# Patient Record
Sex: FEMALE | Race: BLACK OR AFRICAN AMERICAN | NOT HISPANIC OR LATINO | Employment: UNEMPLOYED | URBAN - METROPOLITAN AREA
[De-identification: names, ages, dates, MRNs, and addresses within clinical notes are randomized per-mention and may not be internally consistent; named-entity substitution may affect disease eponyms.]

---

## 2023-11-05 ENCOUNTER — HOSPITAL ENCOUNTER (EMERGENCY)
Facility: HOSPITAL | Age: 58
Discharge: HOME | End: 2023-11-05
Payer: COMMERCIAL

## 2023-11-05 ENCOUNTER — APPOINTMENT (OUTPATIENT)
Dept: RADIOLOGY | Facility: HOSPITAL | Age: 58
End: 2023-11-05
Payer: COMMERCIAL

## 2023-11-05 VITALS
RESPIRATION RATE: 18 BRPM | BODY MASS INDEX: 30.21 KG/M2 | OXYGEN SATURATION: 96 % | WEIGHT: 160 LBS | HEART RATE: 68 BPM | DIASTOLIC BLOOD PRESSURE: 85 MMHG | HEIGHT: 61 IN | TEMPERATURE: 97.2 F | SYSTOLIC BLOOD PRESSURE: 136 MMHG

## 2023-11-05 DIAGNOSIS — G62.9 NEUROPATHY: Primary | ICD-10-CM

## 2023-11-05 PROCEDURE — 99284 EMERGENCY DEPT VISIT MOD MDM: CPT | Performed by: PHYSICIAN ASSISTANT

## 2023-11-05 PROCEDURE — 99283 EMERGENCY DEPT VISIT LOW MDM: CPT | Mod: 25

## 2023-11-05 PROCEDURE — 73110 X-RAY EXAM OF WRIST: CPT | Mod: LT,FR

## 2023-11-05 PROCEDURE — 73110 X-RAY EXAM OF WRIST: CPT | Mod: LEFT SIDE | Performed by: RADIOLOGY

## 2023-11-05 PROCEDURE — 2500000001 HC RX 250 WO HCPCS SELF ADMINISTERED DRUGS (ALT 637 FOR MEDICARE OP): Performed by: PHYSICIAN ASSISTANT

## 2023-11-05 RX ORDER — NAPROXEN 500 MG/1
500 TABLET ORAL ONCE
Status: COMPLETED | OUTPATIENT
Start: 2023-11-05 | End: 2023-11-05

## 2023-11-05 RX ORDER — NAPROXEN 500 MG/1
500 TABLET ORAL 2 TIMES DAILY
Qty: 20 TABLET | Refills: 0 | Status: SHIPPED | OUTPATIENT
Start: 2023-11-05

## 2023-11-05 RX ADMIN — NAPROXEN 500 MG: 500 TABLET ORAL at 21:04

## 2023-11-05 ASSESSMENT — PAIN - FUNCTIONAL ASSESSMENT: PAIN_FUNCTIONAL_ASSESSMENT: 0-10

## 2023-11-05 ASSESSMENT — LIFESTYLE VARIABLES
HAVE PEOPLE ANNOYED YOU BY CRITICIZING YOUR DRINKING: NO
EVER FELT BAD OR GUILTY ABOUT YOUR DRINKING: NO
REASON UNABLE TO ASSESS: NO
HAVE YOU EVER FELT YOU SHOULD CUT DOWN ON YOUR DRINKING: NO
EVER HAD A DRINK FIRST THING IN THE MORNING TO STEADY YOUR NERVES TO GET RID OF A HANGOVER: NO

## 2023-11-05 ASSESSMENT — COLUMBIA-SUICIDE SEVERITY RATING SCALE - C-SSRS
1. IN THE PAST MONTH, HAVE YOU WISHED YOU WERE DEAD OR WISHED YOU COULD GO TO SLEEP AND NOT WAKE UP?: NO
2. HAVE YOU ACTUALLY HAD ANY THOUGHTS OF KILLING YOURSELF?: NO
6. HAVE YOU EVER DONE ANYTHING, STARTED TO DO ANYTHING, OR PREPARED TO DO ANYTHING TO END YOUR LIFE?: NO

## 2023-11-05 ASSESSMENT — PAIN SCALES - GENERAL: PAINLEVEL_OUTOF10: 9

## 2023-11-06 NOTE — ED PROVIDER NOTES
"HPI:  58-year-old female presenting for left wrist pain.  States it began hurting her a few hours ago without any known inciting incident including fall or any other injury.  Was seen at [facility] on 10/21 for right wrist pains and at that time was given gabapentin and oxycodone.  States this feels very similar to that type of pain.  At that appointment she was scheduled for an outpatient neurology appointment to have an EMG performed which is scheduled for next month.  She denies any fall or any other injury for her left hand here.  She denies any weakness or paresthesias.  She is left-hand dominant.      Physical Exam:   GEN: Vitals noted. NAD  EYES:  EOMs grossly intact, anicteric sclera  FLORENCIO: Mucosa moist.  NECK: Supple.  CARD: RRR  PULMONARY: Moving air well. Clear all lung fields.  ABDOMEN: Soft, no guarding, no rigidity. Nontender. NABS  EXTREMITIES: Full ROM, no pitting edema, poorly reproduced diffuse pain poorly localized to the left wrist including with light palpation, positive Phalen's test, decreased strength secondary to pain, brisk capillary refill with 2+ equal pulses bilaterally.  No anatomical snuffbox tenderness  SKIN: Intact, warm and dry  NEURO: Alert and oriented x 3, speech is clear, no obvious deficits noted.       ----------------------------------------------------------------------------------------------------------------------------    MDM:  58-year-old female presenting for left wrist pain.  On exam she is well-appearing able to comfortably.  Vital signs stable.  She has inconsistent exam and is tender to even light palpation, neurovascular intact.  She is given naproxen for pain and will perform plain film x-rays.  She had no anatomical snuffbox tenderness to be concerned for occult scaphoid fracture.  X-ray shows a chronic subluxation of the distal ulna, further attention was given to this area and she has no tenderness at this point she states that it has been like this for \"a " "while\"-seemingly years.  She does have scheduled follow-up with neurology and is encouraged to keep that appointment.  She was reevaluated and was well-appearing resting comfortably however did ask for more oxycodone that she received at [facility] a few weeks ago and was not happy that she would not be receiving more narcotics at this time.  She is encouraged to continue her scheduled follow-up and follow-up with PCP as needed.  Prescription for naproxen provided.  Return precautions reviewed.    XR wrist left 3+ views   Final Result   Relative dorsal subluxation of the distal ulna.   Signed by Jose Angel Raymond II, MD        Labs Reviewed - No data to display  Diagnoses as of 11/05/23 2253   Neuropathy     ----------------------------------------------------------------------------------------------------------------------------    This note was dictated using a speech recognition program.  While an attempt was made at proof reading to minimize errors, minor errors in transcription may be present call for questions.     Otf Fernando PA-C  11/05/23 2254    "

## 2023-11-06 NOTE — DISCHARGE INSTRUCTIONS
Continue the naproxen as prescribed.  Continue with your scheduled follow-up with the neurologist.  Follow-up with your PCP as you would benefit from good continuum of care, if you need a new 1 call the number listed below.

## 2023-11-06 NOTE — ED TRIAGE NOTES
Enters ED reporting L-wrist pain & swelling that began today. Rates pain 9/10. Has not taken anything to relieve symptoms. Denies injury or falls.

## 2023-11-12 ENCOUNTER — HOSPITAL ENCOUNTER (EMERGENCY)
Facility: HOSPITAL | Age: 58
Discharge: HOME | End: 2023-11-12
Attending: EMERGENCY MEDICINE
Payer: COMMERCIAL

## 2023-11-12 ENCOUNTER — APPOINTMENT (OUTPATIENT)
Dept: RADIOLOGY | Facility: HOSPITAL | Age: 58
End: 2023-11-12
Payer: COMMERCIAL

## 2023-11-12 VITALS
TEMPERATURE: 98.2 F | HEART RATE: 65 BPM | SYSTOLIC BLOOD PRESSURE: 134 MMHG | HEIGHT: 61 IN | RESPIRATION RATE: 18 BRPM | DIASTOLIC BLOOD PRESSURE: 73 MMHG | WEIGHT: 158 LBS | OXYGEN SATURATION: 99 % | BODY MASS INDEX: 29.83 KG/M2

## 2023-11-12 DIAGNOSIS — Z04.1 EXAM FOLLOWING MVC (MOTOR VEHICLE COLLISION), NO APPARENT INJURY: Primary | ICD-10-CM

## 2023-11-12 PROCEDURE — 71046 X-RAY EXAM CHEST 2 VIEWS: CPT | Mod: FOREIGN READ | Performed by: RADIOLOGY

## 2023-11-12 PROCEDURE — 72170 X-RAY EXAM OF PELVIS: CPT | Mod: FOREIGN READ | Performed by: RADIOLOGY

## 2023-11-12 PROCEDURE — 73030 X-RAY EXAM OF SHOULDER: CPT | Mod: LT,FR

## 2023-11-12 PROCEDURE — 73552 X-RAY EXAM OF FEMUR 2/>: CPT | Mod: LT,FR

## 2023-11-12 PROCEDURE — 99284 EMERGENCY DEPT VISIT MOD MDM: CPT | Performed by: EMERGENCY MEDICINE

## 2023-11-12 PROCEDURE — 99284 EMERGENCY DEPT VISIT MOD MDM: CPT | Mod: 25

## 2023-11-12 PROCEDURE — 73552 X-RAY EXAM OF FEMUR 2/>: CPT | Mod: LEFT SIDE | Performed by: RADIOLOGY

## 2023-11-12 PROCEDURE — 72170 X-RAY EXAM OF PELVIS: CPT | Mod: FR

## 2023-11-12 PROCEDURE — 71046 X-RAY EXAM CHEST 2 VIEWS: CPT | Mod: FR

## 2023-11-12 PROCEDURE — 2500000001 HC RX 250 WO HCPCS SELF ADMINISTERED DRUGS (ALT 637 FOR MEDICARE OP): Mod: SE | Performed by: STUDENT IN AN ORGANIZED HEALTH CARE EDUCATION/TRAINING PROGRAM

## 2023-11-12 PROCEDURE — 73030 X-RAY EXAM OF SHOULDER: CPT | Mod: LEFT SIDE | Performed by: RADIOLOGY

## 2023-11-12 PROCEDURE — 99285 EMERGENCY DEPT VISIT HI MDM: CPT | Mod: 25 | Performed by: EMERGENCY MEDICINE

## 2023-11-12 RX ORDER — ACETAMINOPHEN 325 MG/1
650 TABLET ORAL ONCE
Status: COMPLETED | OUTPATIENT
Start: 2023-11-12 | End: 2023-11-12

## 2023-11-12 RX ORDER — IBUPROFEN 600 MG/1
600 TABLET ORAL ONCE
Status: COMPLETED | OUTPATIENT
Start: 2023-11-12 | End: 2023-11-12

## 2023-11-12 RX ORDER — NAPROXEN 500 MG/1
500 TABLET ORAL
Qty: 30 TABLET | Refills: 0 | Status: SHIPPED | OUTPATIENT
Start: 2023-11-12 | End: 2023-11-27

## 2023-11-12 RX ORDER — ACETAMINOPHEN 500 MG
1000 TABLET ORAL EVERY 6 HOURS PRN
Qty: 30 TABLET | Refills: 0 | Status: SHIPPED | OUTPATIENT
Start: 2023-11-12 | End: 2023-11-22

## 2023-11-12 RX ORDER — CYCLOBENZAPRINE HCL 10 MG
5 TABLET ORAL ONCE
Status: COMPLETED | OUTPATIENT
Start: 2023-11-12 | End: 2023-11-12

## 2023-11-12 RX ADMIN — CYCLOBENZAPRINE 5 MG: 10 TABLET, FILM COATED ORAL at 18:13

## 2023-11-12 RX ADMIN — ACETAMINOPHEN 650 MG: 325 TABLET ORAL at 18:13

## 2023-11-12 RX ADMIN — IBUPROFEN 600 MG: 600 TABLET, FILM COATED ORAL at 18:13

## 2023-11-12 ASSESSMENT — COLUMBIA-SUICIDE SEVERITY RATING SCALE - C-SSRS
6. HAVE YOU EVER DONE ANYTHING, STARTED TO DO ANYTHING, OR PREPARED TO DO ANYTHING TO END YOUR LIFE?: NO
1. IN THE PAST MONTH, HAVE YOU WISHED YOU WERE DEAD OR WISHED YOU COULD GO TO SLEEP AND NOT WAKE UP?: NO
2. HAVE YOU ACTUALLY HAD ANY THOUGHTS OF KILLING YOURSELF?: NO
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
